# Patient Record
(demographics unavailable — no encounter records)

---

## 2025-04-11 NOTE — HISTORY OF PRESENT ILLNESS
[FreeTextEntry1] : Checkup [de-identified] : 72-year-old female is here for checkup.  This is my first time seeing this patient.   Patient denies any fevers, chills, nausea, vomiting, diarrhea, constipation, chest pain, shortness of breath, weakness, numbness, tingling at this time.   Patient reports that she has had 2 COVID vaccines in the past.   Alcohol: Denies. Patient reports that they do not drink alcohol.  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 Patient reports that they smoke 1/2 pack per day.  Patient reports that the most that they have smoked is 1 pack per day.  Patient reports that they have been smoking for 66 years.  States that on average she smoked half a pack per day.  Educated the patient on smoking cessation.  Told the patient to quit smoking cigarettes Illicit Drugs: Denies. Patient reports that they do not use any recreational drugs.  States she used to smoke marijuana in the past. States that she has not smoked marijuana for more than 20 years ago. Colonoscopy: Patient reports that their last colonoscopy was in 2010.  They report that the colonoscopy was incomplete.  Will refer to GI for routine screening.  Gave GI referral on 4-. Mammogram:  Patient reports that their last mammogram was in 2005.  They report that the mammogram was normal.  They report that a repeat mammogram now.  Mammogram referral given to the patient on 4-11-25. PAP smear:  Patient reports that their last PAP smear was in 2005.  They report that the PAP smear was normal.  She wishes to see an OB/GYN.  Will refer to OB/GYN.  OB/GYN referral given to patient on 4-11-25. DEXA: Patient reports that the last DEXA scan was in 2012.  They report that the DEXA scan was normal.  DEXA scan given to the patient on 4- Diet: Patient reports that they are good with diet. Exercise: Patient reports that they are not that good with exercise. Living Situation: Family.  Patient reports that they live alone. Employment Status: Retired.  Patient reports that they are Retired. 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 Education: Reports that the highest level of education is College (BA Degree). Relationship Status: Single. Number of kids : 0  ADLs: Fully functional (bathing, dressing, toileting, transferring, walking, feeding).  Patient reports that they can perform ADLs IADLs: Fully functional and needs no help or supervision to perform IADLs (using the telephone, shopping, preparing meals, housekeeping, doing laundry, using transportation, managing medications and managing finances).  Patient reports that they can perform IADLs.

## 2025-04-11 NOTE — HEALTH RISK ASSESSMENT
[1] : 2) Feeling down, depressed, or hopeless for several days (1) [Current] : Current [20 or more] : 20 or more [< 15 Years] : < 15 Years [[id number]] : 2

## 2025-04-11 NOTE — ASSESSMENT
[FreeTextEntry1] : And all patient came in just for routine blood work.  States her last PCP was Dr. Lucero Jack. States Dr. Lucero Jack office closed down and there is no way to get in touch with her. Unsure why the patient has not had any screening examinations for more than 10 years. Educated the patient on age-appropriate screening. Ordered age-appropriate screening per guidelines at this point.  Will do routine blood work in the form of CBC, CMP, A1c, lipid, TSH, B12 folate at this point.  She may follow-up earlier if needed Pt was told to call with problems or concerns. The patient was told to seek immediate attention by calling 911 or going to the ER if her condition does not improve or gets acutely worse.

## 2025-04-16 NOTE — HISTORY OF PRESENT ILLNESS
[FreeTextEntry1] : follow up [de-identified] : 72-year-old female is here for a follow-up   Patient denies any fevers, chills, nausea, vomiting, diarrhea, constipation, chest pain, shortness of breath, weakness, numbness, tingling at this time.   Patient reports that she has had 2 COVID vaccines in the past.   Alcohol: Denies. Patient reports that they do not drink alcohol.  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 Patient reports that they smoke 1/2 pack per day.  Patient reports that the most that they have smoked is 1 pack per day.  Patient reports that they have been smoking for 66 years.  States that on average she smoked half a pack per day.  Educated the patient on smoking cessation.  Told the patient to quit smoking cigarettes Illicit Drugs: Denies. Patient reports that they do not use any recreational drugs.  States she used to smoke marijuana in the past. States that she has not smoked marijuana for more than 20 years ago. Colonoscopy: Patient reports that their last colonoscopy was in 2010.  They report that the colonoscopy was incomplete.  She was referred to GI for routine screening.  Gave GI referral on 4-. Mammogram:  Patient reports that their last mammogram was in 2005.  They report that the mammogram was normal.  They report that a repeat mammogram now.  Mammogram referral given to the patient on 4-11-25. PAP smear:  Patient reports that their last PAP smear was in 2005.  They report that the PAP smear was normal. She was referred to OB/GYN.  OB/GYN referral given to patient on 4-11-25. DEXA: Patient reports that the last DEXA scan was in 2012.  They report that the DEXA scan was normal.  DEXA scan given to the patient on 4- Diet: Patient reports that they are good with diet. Exercise: Patient reports that they are not that good with exercise. Living Situation: Family.  Patient reports that they live alone. Employment Status: Retired.  Patient reports that they are Retired. 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 Education: Reports that the highest level of education is College (BA Degree). Relationship Status: Single. Number of kids : 0  ADLs: Fully functional (bathing, dressing, toileting, transferring, walking, feeding).  Patient reports that they can perform ADLs IADLs: Fully functional and needs no help or supervision to perform IADLs (using the telephone, shopping, preparing meals, housekeeping, doing laundry, using transportation, managing medications and managing finances).  Patient reports that they can perform IADLs.

## 2025-04-16 NOTE — ASSESSMENT
[FreeTextEntry1] : Follow-up in 4 months.  she may follow-up earlier if needed Pt was told to call with problems or concerns. The patient was told to seek immediate attention by calling 911 or going to the ER if her condition does not improve or gets acutely worse.